# Patient Record
Sex: MALE | Race: BLACK OR AFRICAN AMERICAN | Employment: FULL TIME | URBAN - METROPOLITAN AREA
[De-identification: names, ages, dates, MRNs, and addresses within clinical notes are randomized per-mention and may not be internally consistent; named-entity substitution may affect disease eponyms.]

---

## 2022-11-18 ENCOUNTER — HOSPITAL ENCOUNTER (EMERGENCY)
Age: 47
Discharge: HOME | End: 2022-11-18
Attending: EMERGENCY MEDICINE
Payer: COMMERCIAL

## 2022-11-18 ENCOUNTER — APPOINTMENT (OUTPATIENT)
Dept: GENERAL RADIOLOGY | Age: 47
End: 2022-11-18
Attending: EMERGENCY MEDICINE
Payer: COMMERCIAL

## 2022-11-18 ENCOUNTER — APPOINTMENT (OUTPATIENT)
Dept: CT IMAGING | Age: 47
End: 2022-11-18
Attending: EMERGENCY MEDICINE
Payer: COMMERCIAL

## 2022-11-18 VITALS
SYSTOLIC BLOOD PRESSURE: 142 MMHG | WEIGHT: 190 LBS | BODY MASS INDEX: 24.38 KG/M2 | TEMPERATURE: 98.1 F | DIASTOLIC BLOOD PRESSURE: 81 MMHG | OXYGEN SATURATION: 98 % | HEART RATE: 67 BPM | HEIGHT: 74 IN | RESPIRATION RATE: 20 BRPM

## 2022-11-18 DIAGNOSIS — R07.89 ATYPICAL CHEST PAIN: Primary | ICD-10-CM

## 2022-11-18 LAB
ALBUMIN SERPL-MCNC: 4.3 G/DL (ref 3.5–5)
ALBUMIN/GLOB SERPL: 1.2 {RATIO} (ref 1.1–2.2)
ALP SERPL-CCNC: 89 U/L (ref 45–117)
ALT SERPL-CCNC: 31 U/L (ref 12–78)
AMPHET UR QL SCN: NEGATIVE
ANION GAP SERPL CALC-SCNC: 6 MMOL/L (ref 5–15)
AST SERPL-CCNC: 19 U/L (ref 15–37)
ATRIAL RATE: 68 BPM
BARBITURATES UR QL SCN: NEGATIVE
BASOPHILS # BLD: 0 K/UL (ref 0–0.1)
BASOPHILS NFR BLD: 0 % (ref 0–1)
BENZODIAZ UR QL: NEGATIVE
BILIRUB SERPL-MCNC: 0.6 MG/DL (ref 0.2–1)
BNP SERPL-MCNC: 6 PG/ML
BUN SERPL-MCNC: 9 MG/DL (ref 6–20)
BUN/CREAT SERPL: 8 (ref 12–20)
CALCIUM SERPL-MCNC: 9.7 MG/DL (ref 8.5–10.1)
CALCULATED P AXIS, ECG09: 44 DEGREES
CALCULATED R AXIS, ECG10: -15 DEGREES
CALCULATED T AXIS, ECG11: -5 DEGREES
CANNABINOIDS UR QL SCN: NEGATIVE
CHLORIDE SERPL-SCNC: 108 MMOL/L (ref 97–108)
CO2 SERPL-SCNC: 25 MMOL/L (ref 21–32)
COCAINE UR QL SCN: NEGATIVE
CREAT SERPL-MCNC: 1.08 MG/DL (ref 0.7–1.3)
DIAGNOSIS, 93000: NORMAL
DIFFERENTIAL METHOD BLD: NORMAL
DRUG SCRN COMMENT,DRGCM: NORMAL
EOSINOPHIL # BLD: 0.1 K/UL (ref 0–0.4)
EOSINOPHIL NFR BLD: 1 % (ref 0–7)
ERYTHROCYTE [DISTWIDTH] IN BLOOD BY AUTOMATED COUNT: 11.8 % (ref 11.5–14.5)
GLOBULIN SER CALC-MCNC: 3.6 G/DL (ref 2–4)
GLUCOSE SERPL-MCNC: 104 MG/DL (ref 65–100)
HCT VFR BLD AUTO: 45.1 % (ref 36.6–50.3)
HGB BLD-MCNC: 15.5 G/DL (ref 12.1–17)
IMM GRANULOCYTES # BLD AUTO: 0 K/UL (ref 0–0.04)
IMM GRANULOCYTES NFR BLD AUTO: 0 % (ref 0–0.5)
LYMPHOCYTES # BLD: 3 K/UL (ref 0.8–3.5)
LYMPHOCYTES NFR BLD: 37 % (ref 12–49)
MCH RBC QN AUTO: 31.9 PG (ref 26–34)
MCHC RBC AUTO-ENTMCNC: 34.4 G/DL (ref 30–36.5)
MCV RBC AUTO: 92.8 FL (ref 80–99)
METHADONE UR QL: NEGATIVE
MONOCYTES # BLD: 0.4 K/UL (ref 0–1)
MONOCYTES NFR BLD: 5 % (ref 5–13)
NEUTS SEG # BLD: 4.7 K/UL (ref 1.8–8)
NEUTS SEG NFR BLD: 57 % (ref 32–75)
NRBC # BLD: 0 K/UL (ref 0–0.01)
NRBC BLD-RTO: 0 PER 100 WBC
OPIATES UR QL: NEGATIVE
P-R INTERVAL, ECG05: 160 MS
PCP UR QL: NEGATIVE
PLATELET # BLD AUTO: 206 K/UL (ref 150–400)
PMV BLD AUTO: 9.4 FL (ref 8.9–12.9)
POTASSIUM SERPL-SCNC: 4 MMOL/L (ref 3.5–5.1)
PROT SERPL-MCNC: 7.9 G/DL (ref 6.4–8.2)
Q-T INTERVAL, ECG07: 384 MS
QRS DURATION, ECG06: 88 MS
QTC CALCULATION (BEZET), ECG08: 408 MS
RBC # BLD AUTO: 4.86 M/UL (ref 4.1–5.7)
SODIUM SERPL-SCNC: 139 MMOL/L (ref 136–145)
TROPONIN-HIGH SENSITIVITY: 6 NG/L (ref 0–76)
TROPONIN-HIGH SENSITIVITY: 6 NG/L (ref 0–76)
VENTRICULAR RATE, ECG03: 68 BPM
WBC # BLD AUTO: 8.2 K/UL (ref 4.1–11.1)

## 2022-11-18 PROCEDURE — 93005 ELECTROCARDIOGRAM TRACING: CPT

## 2022-11-18 PROCEDURE — 83880 ASSAY OF NATRIURETIC PEPTIDE: CPT

## 2022-11-18 PROCEDURE — 36415 COLL VENOUS BLD VENIPUNCTURE: CPT

## 2022-11-18 PROCEDURE — 71045 X-RAY EXAM CHEST 1 VIEW: CPT

## 2022-11-18 PROCEDURE — 80307 DRUG TEST PRSMV CHEM ANLYZR: CPT

## 2022-11-18 PROCEDURE — 80053 COMPREHEN METABOLIC PANEL: CPT

## 2022-11-18 PROCEDURE — 85025 COMPLETE CBC W/AUTO DIFF WBC: CPT

## 2022-11-18 PROCEDURE — 74011000636 HC RX REV CODE- 636: Performed by: EMERGENCY MEDICINE

## 2022-11-18 PROCEDURE — 84484 ASSAY OF TROPONIN QUANT: CPT

## 2022-11-18 PROCEDURE — 71275 CT ANGIOGRAPHY CHEST: CPT

## 2022-11-18 PROCEDURE — 99285 EMERGENCY DEPT VISIT HI MDM: CPT

## 2022-11-18 RX ADMIN — IOPAMIDOL 100 ML: 755 INJECTION, SOLUTION INTRAVENOUS at 12:01

## 2022-11-18 NOTE — ED PROVIDER NOTES
EMERGENCY DEPARTMENT HISTORY AND PHYSICAL EXAM      Date: 11/18/2022  Patient Name: Treva Najjar    History of Presenting Illness     Chief Complaint   Patient presents with    Chest Pain     Reports sporadic episodes of sharp chest pain and SOB since he was 17years old. Had cardiac work up yesterday that was unremarkable. Denied any blurred vision, nausea, or back pain. History Provided By: Patient    HPI: Treva Najjar, 41 y.o. male with PMHx significant for nothing who presents with a chief complaint of acute onset of sharp, midsternal chest pain around 10:30 AM.  He states the pain is now improved but is still dull and feels like pressure. He tells me that when these episodes occur he can \"feel his heartbeat\" and that he becomes lightheaded and has onset of pain. He tells me he has been having these episodes for as long as he can remember and has daily chest pain but sometimes he has these episodes where it is much worse. He denies any productive cough, fever, abdominal pain, nausea, vomiting. He was seen at an outside facility yesterday for similar symptoms and had a negative work-up at the time. He did just recently drive here from [place] as he is relocating for work. He denies any history of PE. No unilateral leg swelling. PCP: Sander Curtis MD    There are no other associated signs or symptoms. No other exacerbating or alleviating factors. There are no other complaints, changes, or physical findings at this time. Past History     Past Medical History:  No past medical history on file. Past Surgical History:  No past surgical history on file. Family History:  No family history on file. Social History: Allergies: Allergies   Allergen Reactions    Aspirin Anaphylaxis     Review of Systems   Review of Systems   Constitutional:  Negative for chills and fever. HENT:  Negative for congestion, rhinorrhea and sore throat.     Respiratory:  Negative for cough and shortness of breath. Cardiovascular:  Positive for chest pain. Gastrointestinal:  Negative for abdominal pain, nausea and vomiting. Genitourinary:  Negative for dysuria and urgency. Skin:  Negative for rash. Neurological:  Negative for dizziness, light-headedness and headaches. All other systems reviewed and are negative. Physical Exam   Physical Exam  Vitals and nursing note reviewed. Constitutional:       General: He is not in acute distress. Appearance: He is well-developed. HENT:      Head: Normocephalic and atraumatic. Eyes:      Conjunctiva/sclera: Conjunctivae normal.      Pupils: Pupils are equal, round, and reactive to light. Cardiovascular:      Rate and Rhythm: Normal rate and regular rhythm. Pulmonary:      Effort: Pulmonary effort is normal. No respiratory distress. Breath sounds: Normal breath sounds. No stridor. Abdominal:      General: There is no distension. Palpations: Abdomen is soft. Tenderness: There is no abdominal tenderness. Musculoskeletal:         General: Normal range of motion. Cervical back: Normal range of motion. Skin:     General: Skin is warm and dry. Neurological:      Mental Status: He is alert and oriented to person, place, and time. Psychiatric:         Mood and Affect: Mood normal.         Thought Content:  Thought content normal.     Diagnostic Study Results   Labs -     Recent Results (from the past 12 hour(s))   EKG, 12 LEAD, INITIAL    Collection Time: 11/18/22 11:00 AM   Result Value Ref Range    Ventricular Rate 68 BPM    Atrial Rate 68 BPM    P-R Interval 160 ms    QRS Duration 88 ms    Q-T Interval 384 ms    QTC Calculation (Bezet) 408 ms    Calculated P Axis 44 degrees    Calculated R Axis -15 degrees    Calculated T Axis -5 degrees    Diagnosis       Normal sinus rhythm  No previous ECGs available  Confirmed by Noah Velasco ([id number]) on 11/18/2022 11:48:25 AM     CBC WITH AUTOMATED DIFF    Collection Time: 11/18/22 11:05 AM Result Value Ref Range    WBC 8.2 4.1 - 11.1 K/uL    RBC 4.86 4.10 - 5.70 M/uL    HGB 15.5 12.1 - 17.0 g/dL    HCT 45.1 36.6 - 50.3 %    MCV 92.8 80.0 - 99.0 FL    MCH 31.9 26.0 - 34.0 PG    MCHC 34.4 30.0 - 36.5 g/dL    RDW 11.8 11.5 - 14.5 %    PLATELET 775 049 - 230 K/uL    MPV 9.4 8.9 - 12.9 FL    NRBC 0.0 0  WBC    ABSOLUTE NRBC 0.00 0.00 - 0.01 K/uL    NEUTROPHILS 57 32 - 75 %    LYMPHOCYTES 37 12 - 49 %    MONOCYTES 5 5 - 13 %    EOSINOPHILS 1 0 - 7 %    BASOPHILS 0 0 - 1 %    IMMATURE GRANULOCYTES 0 0.0 - 0.5 %    ABS. NEUTROPHILS 4.7 1.8 - 8.0 K/UL    ABS. LYMPHOCYTES 3.0 0.8 - 3.5 K/UL    ABS. MONOCYTES 0.4 0.0 - 1.0 K/UL    ABS. EOSINOPHILS 0.1 0.0 - 0.4 K/UL    ABS. BASOPHILS 0.0 0.0 - 0.1 K/UL    ABS. IMM. GRANS. 0.0 0.00 - 0.04 K/UL    DF AUTOMATED     METABOLIC PANEL, COMPREHENSIVE    Collection Time: 11/18/22 11:05 AM   Result Value Ref Range    Sodium 139 136 - 145 mmol/L    Potassium 4.0 3.5 - 5.1 mmol/L    Chloride 108 97 - 108 mmol/L    CO2 25 21 - 32 mmol/L    Anion gap 6 5 - 15 mmol/L    Glucose 104 (H) 65 - 100 mg/dL    BUN 9 6 - 20 MG/DL    Creatinine 1.08 0.70 - 1.30 MG/DL    BUN/Creatinine ratio 8 (L) 12 - 20      eGFR >60 >60 ml/min/1.73m2    Calcium 9.7 8.5 - 10.1 MG/DL    Bilirubin, total 0.6 0.2 - 1.0 MG/DL    ALT (SGPT) 31 12 - 78 U/L    AST (SGOT) 19 15 - 37 U/L    Alk.  phosphatase 89 45 - 117 U/L    Protein, total 7.9 6.4 - 8.2 g/dL    Albumin 4.3 3.5 - 5.0 g/dL    Globulin 3.6 2.0 - 4.0 g/dL    A-G Ratio 1.2 1.1 - 2.2     NT-PRO BNP    Collection Time: 11/18/22 11:05 AM   Result Value Ref Range    NT pro-BNP 6 <125 PG/ML   TROPONIN-HIGH SENSITIVITY    Collection Time: 11/18/22 11:05 AM   Result Value Ref Range    Troponin-High Sensitivity 6 0 - 76 ng/L   TROPONIN-HIGH SENSITIVITY    Collection Time: 11/18/22 12:19 PM   Result Value Ref Range    Troponin-High Sensitivity 6 0 - 76 ng/L   DRUG SCREEN, URINE    Collection Time: 11/18/22 12:25 PM   Result Value Ref Range AMPHETAMINES Negative NEG      BARBITURATES Negative NEG      BENZODIAZEPINES Negative NEG      COCAINE Negative NEG      METHADONE Negative NEG      OPIATES Negative NEG      PCP(PHENCYCLIDINE) Negative NEG      THC (TH-CANNABINOL) Negative NEG      Drug screen comment (NOTE)        Radiologic Studies -   CTA CHEST W OR W WO CONT   Final Result   1. No evidence of pulmonary embolus. 2.  Clear lungs. XR CHEST PORT   Final Result   No acute abnormality              CTA CHEST W OR W WO CONT    Result Date: 11/18/2022  1. No evidence of pulmonary embolus. 2.  Clear lungs. XR CHEST PORT    Result Date: 11/18/2022  No acute abnormality    Medical Decision Making   I am the first provider for this patient. I reviewed the vital signs, available nursing notes, past medical history, past surgical history, family history and social history. Vital Signs-Reviewed the patient's vital signs. Patient Vitals for the past 12 hrs:   Temp Pulse Resp BP SpO2   11/18/22 1059 98.1 °F (36.7 °C) 67 20 (!) 142/81 98 %       Pulse Oximetry Analysis - 98% on ra    Cardiac Monitor:   Rate: 67 bpm  Rhythm: Normal Sinus Rhythm      Medications Administered       iopamidoL (ISOVUE-370) 76 % injection 100 mL       Admin Date  11/18/2022 Action  Given Dose  100 mL Route  IntraVENous Administered By  Abdirashid Aide                     ED EKG interpretation:  Rhythm: normal sinus rhythm; and regular . Rate (approx.): 68; Axis: normal; P wave: normal; QRS interval: normal ; ST/T wave: normal; Other findings: normal. This EKG was interpreted by LAURA Grimes MD,ED Provider. Records Reviewed: Nursing Notes    Provider Notes (Medical Decision Making):   Patient presents with a chief complaint of chest pain. Tells me he has had frequent episodes like this in the past and has some daily chest pain at baseline. Negative work-up done yesterday.   I am unable to see any of this work-up so presumably he had it done at a [facility]. On my exam he is nontoxic-appearing with stable vital signs. Differential includes ACS, musculoskeletal pain, GERD, anxiety, PE given recent travel from [place]. Plan for basic labs, troponin x2, CTA chest, EKG, reevaluation. ED Course:   Initial assessment performed. The patients presenting problems have been discussed, and they are in agreement with the care plan formulated and outlined with them. I have encouraged them to ask questions as they arise throughout their visit. Troponin returned at 6. Repeat flat. Additional lab work unremarkable. CT of the chest does not show any acute pulmonary embolism or other acute findings. I suggested that the patient follow-up with cardiology if his episodes of chest pain persist as he may need outpatient monitoring. We will provide names of cardiologist as well as a list of primary care's in the area since he will be relocating here. Procedures:  Procedures    Critical Care:  none    Disposition:  Discharge Note:  The patient has been re-evaluated and is ready for discharge. Reviewed available results with patient. Counseled patient on diagnosis and care plan. Patient has expressed understanding, and all questions have been answered. Patient agrees with plan and agrees to follow up as recommended, or to return to the ED if their symptoms worsen. Discharge instructions have been provided and explained to the patient, along with reasons to return to the ED. PLAN:  1. There are no discharge medications for this patient.     2.   Follow-up Information       Follow up With Specialties Details Why Contact Info    Sabra Harvey MD Cardio Vascular Surgery, Cardiovascular Disease Physician   [address]  [id number]  [address] [phone]      Martha Mendenhall, [address] [facility] Vascular Surgery, Interventional Cardiology Physician, Cardiovascular Disease Physician   [address] [place] [address]  [phone]            Return to ED if worse     Diagnosis     Clinical Impression:   1. Atypical chest pain            Please note that this dictation was completed with [employer], the computer voice recognition software. Quite often unanticipated grammatical, syntax, homophones, and other interpretive errors are inadvertently transcribed by the computer software. Please disregard these errors.   Please excuse any errors that have escaped final proofreading

## 2022-11-18 NOTE — DISCHARGE INSTRUCTIONS
Local Primary Care Physicians     [facility] [phone]   MD Zeny Nassar, MD Xiomara Sibley MD [facility] [phone]   Jami Joseph, [facility]   Sushila Jerez, MD Khoi Jefferson MD [address] [phone]   MD Raeann Bah [place] [address] [facility] [phone]   MD Nader Harman, MD Bradly Etienne MD     [facility] [phone]   FYUV GMFFWT CX, MD Ismael Munguia, MD Taya Person, NP [address] [facility] [address] [phone]   MD Amarilis Varela MD Barbaraann Reeves, MD Keven Schwartz, MD Erwin Alvarez, MD Norma Hardin, MD Bon Harding MD     [address] [facility]   Jordi Murray MD    [address] [phone]   Ladarius Isaacs, MD Garrick Gonzalez, NP   Kati Sanchez, MD Candy Martinez, MD Victoriano Bess, MD Gera Kidd MD   [address] [phone]   MD Trudy Anderson, [facility]   Yaneth Bell, NP   Nilesh Bergeron, MD Hermelindo Wilkins, MD Karlene Muhammad, MD Jovani Child MD   [facility] [phone]   MD Coral Hernandez MD Carollee Crutch, MD Doren Ehrich, MD Moose Del Rosario MD     [address] [phone]   MD Linda Valencia MD [place] [phone]   MD Errol Lombardo MD Bailey Curie, MD     [facility] [phone]   [place] G[place]vara, MD Jamie Disla, MD Sonia Felder, MD Harlan Singletary, MD Paulo Underwood, MD Maira Brown, NP   Nuris Brennan MD     [address]   [phone]   MD Twila Bolanos MD     [address] [facility] [phone]     MD Wilber Crooks [facility] BEATRIZ Tovar FNP Olinda Frieze, PA-C Angelena Edison, MD Tawnya Heys, NP Reymundo Nimrod, DO Miscellaneous:     [facility] Departments    For adult and child immunizations, family planning, TB screening, STD testing and women's health services. [place]: [place] [phone]   [address]   [facility]   [address] [facility]: Christopher Brantley 6207 [facility] [phone]   [address] [facility]        [address] [place] [address]    For primary care services, woman and child wellness, and some clinics providing specialty care. [facility] -- [address] [facility]. [facility]5 [facility] [phone]  [address] [place] [address] 200 [facility] 3614 [facility] [phone]  [address] [place] [address] [address] [place] [address] [phone] [address] [place] [address] 5850 [facility]  [phone]  [address] [facility] [address] [address] [place] [phone]  [facility] 81 [facility] [phone]  Charlette Bob [facility] 1051 [facility] [phone]  [facility] 700 [facility] [phone]    [address] [phone]  [facility] Out[facility] 5850  Community  [phone]  [employer]  [address], [address] ([email or web address]) [phone]       Sexual Health/Woman Wellness Clinics   For STD/HIV testing and treatment, pregnancy testing and services, men's health, birth control services, LGBT services, and hepatitis/HPV vaccine services. [employer] for [place] [employer] [address] [facility] 75 [facility] 15[address]  Aury Kay [phone]  [facility] [address], 5th floor [phone]  Pregnancy [place] [address] [address] [facility] [address] [phone]       8260 [facility] 401 [facility],[address]   [phone]  [place]   [phone]    Women, Infant and Children's Services:   [address] [phone]  [address] [phone]  [facility]   [phone]  [facility]   [phone] 6[address] [facility]     [phone]  [address] Crisis   [address] [phone]          Thank you for allowing us to provide you with excellent care today. We hope we addressed all of your concerns and needs. We strive to provide excellent quality care in the Emergency Department. Please rate us as excellent, as anything less than excellent does not meet our expectations. If you feel that you have not received excellent quality care or timely care, please ask to speak to the nurse manager. Please choose us in the future for your continued health care needs. The exam and treatment you received in the Emergency Department were for an urgent problem and are not intended as complete care. It is important that you follow up with a doctor, nurse practitioner, or physician assistant for ongoing care. If your symptoms become worse or you do not improve as expected and you are unable to reach your usual health care provider, you should return to the Emergency Department. We are available 24 hours a day. Take this sheet with you when you go to your follow-up visit. If you have any problem arranging the follow-up visit, contact the Emergency Department immediately. If a prescription has been provided, please have it filled as soon as possible to avoid a delay in treatment. Read the entire medication instruction sheet provided to you by the pharmacy.  If you have any questions or reservations about taking the medication due to side effects or interactions with other medications please call the ER or your primary care physician. Take this sheet with you when you go to your follow-up visit.